# Patient Record
Sex: MALE | Race: WHITE
[De-identification: names, ages, dates, MRNs, and addresses within clinical notes are randomized per-mention and may not be internally consistent; named-entity substitution may affect disease eponyms.]

---

## 2017-07-12 ENCOUNTER — HOSPITAL ENCOUNTER (EMERGENCY)
Dept: HOSPITAL 25 - UCEAST | Age: 62
Discharge: HOME | End: 2017-07-12
Payer: COMMERCIAL

## 2017-07-12 VITALS — DIASTOLIC BLOOD PRESSURE: 92 MMHG | SYSTOLIC BLOOD PRESSURE: 118 MMHG
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
## 2017-07-12 DIAGNOSIS — Z88.5: ICD-10-CM
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
## 2017-07-12 DIAGNOSIS — R03.0: ICD-10-CM

## 2017-07-12 DIAGNOSIS — S61.210A: Primary | ICD-10-CM

## 2017-07-12 PROCEDURE — 99212 OFFICE O/P EST SF 10 MIN: CPT

## 2017-07-12 PROCEDURE — G0463 HOSPITAL OUTPT CLINIC VISIT: HCPCS

## 2017-07-12 NOTE — UC
Hand/Wrist HPI





- HPI Summary


HPI Summary: 





Cut R index finger on a router, area of skin missing and profuse bleeding, 

unable to control it. Last tetanus 2013.





- History Of Current Complaint


Chief Complaint: UCLaceration


Stated Complaint: FINGER LACERATION


Time Seen by Provider: 07/12/17 13:43


Hx Obtained From: Patient


Pregnant?: No


Onset/Duration: Sudden Onset


Severity Initially: Moderate


Severity Currently: Moderate


Character Of Pain: Sharp


Aggravating Factor(s): Other - touch


Alleviating: Compression


Related History: Dominant Hand Right





- Allergies/Home Medications


Allergies/Adverse Reactions: 


 Allergies











Allergy/AdvReac Type Severity Reaction Status Date / Time


 


Tramadol Allergy  Unknown Verified 08/27/16 13:04





   Reaction  





   Details  














PMH/Surg Hx/FS Hx/Imm Hx


Previously Healthy: Yes


Other History Of: 


   Negative For: Anticoagulant Therapy





- Surgical History


Surgical History: Yes


Surgery Procedure, Year, and Place: scrotal surgery





- Family History


Known Family History: Positive: None, Other - no respiratory disease


   Negative: Cardiac Disease





- Social History


Alcohol Use: Daily


Substance Use Type: None


Smoking Status (MU): Never Smoked Tobacco





- Immunization History


Most Recent Tetanus Shot: Oct 2013





Review of Systems


Constitutional: Negative


Skin: Other - R index finger injury


Eyes: Negative


ENT: Negative


Respiratory: Negative


Cardiovascular: Negative


Gastrointestinal: Negative


Genitourinary: Negative


Motor: Negative


Neurovascular: Negative


Musculoskeletal: Negative


Neurological: Negative


Psychological: Negative


All Other Systems Reviewed And Are Negative: Yes





Physical Exam


Triage Information Reviewed: Yes


Appearance: Well-Appearing, No Pain Distress, Well-Nourished


Vital Signs: 


 Initial Vital Signs











Temp  98 F   07/12/17 13:47


 


Pulse  54   07/12/17 13:47


 


Resp  16   07/12/17 13:47


 


BP  118/92   07/12/17 13:47


 


Pulse Ox  100   07/12/17 13:47











Vital Signs Reviewed: Yes


Eye Exam: Normal


Eyes: Positive: Conjunctiva Clear


ENT Exam: Normal


ENT: Positive: Normal ENT inspection, Hearing grossly normal, Pharynx normal, 

TMs normal


Dental Exam: Normal


Neck exam: Normal


Neck: Positive: Supple, Nontender, No Lymphadenopathy


Respiratory Exam: Normal


Respiratory: Positive: Chest non-tender, Lungs clear, Normal breath sounds, No 

respiratory distress, No accessory muscle use


Cardiovascular Exam: Normal


Cardiovascular: Positive: RRR, No Murmur


Musculoskeletal Exam: Normal


Neurological Exam: Normal


Neurological: Positive: Alert


Psychological Exam: Normal


Skin Exam: Other - skin avulsion on R index finger approx 1.5cm x 1cm, 

significant bleeding. Wound bandaged with gel foam.





Hand/Wrist Course/Dx





- Differential Dx/Diagnosis


Provider Diagnoses: R index finger skin avulsion.  elevated blood pressure due 

to stress





Discharge





- Discharge Plan


Condition: Stable


Disposition: HOME


Patient Education Materials:  Skin Avulsion (ED)


Additional Instructions: 


Try to keep the dressing dry and intact for 36-48 hours. After that, change 

your dressing (could be a regular adhesive bandage) whenever it is wet or 

dirty. You can wait until the gel foam comes off on its own, or you can soak it 

off in warm water after 5 days.

## 2018-01-07 ENCOUNTER — HOSPITAL ENCOUNTER (EMERGENCY)
Dept: HOSPITAL 25 - UCEAST | Age: 63
Discharge: HOME | End: 2018-01-07
Payer: COMMERCIAL

## 2018-01-07 VITALS — SYSTOLIC BLOOD PRESSURE: 113 MMHG | DIASTOLIC BLOOD PRESSURE: 77 MMHG

## 2018-01-07 DIAGNOSIS — Z72.89: ICD-10-CM

## 2018-01-07 DIAGNOSIS — S06.0X9A: Primary | ICD-10-CM
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
## 2018-01-07 DIAGNOSIS — S01.01XA: ICD-10-CM
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
## 2018-01-07 PROCEDURE — G0463 HOSPITAL OUTPT CLINIC VISIT: HCPCS

## 2018-01-07 PROCEDURE — 99212 OFFICE O/P EST SF 10 MIN: CPT

## 2018-01-07 NOTE — UC
Head Injury HPI





- HPI Summary


HPI Summary: 





Pt was skiing, was struck by a ski lift in the body and felt some bruising 

mainly in the L arm from that -- denies head or neck injury from ski lift. On 

the same run down the hill fell backwards and hit head on the ground, and place 

where goggles clasped behind his head he sustained bloody wound. Reports seeing 

stars after fall, then felt fuzzy/confused and still feels a little off, with 

only hazy memory of the ski lift and the fall on the slope. Denies current 

headache, any vomiting, seizures, visual changes, or trouble with balance.





- History Of Current Complaint


Chief Complaint: UCHeadInjury


Stated Complaint: FALL


Time Seen by Provider: 01/07/18 19:48


Hx Obtained From: Patient


Onset/Duration: Sudden Onset


Severity Currently: Mild


Severity Initially: Moderate


Character: Dull


Aggravating Factor(s): Nothing


Alleviating Factor(s): Nothing


Associated Signs And Symptoms: Positive: Confusion.  Negative: LOC (Time In 

Secs./Mins/Hrs), Memory Loss, Seizure, Neck Pain, Nausea, Vomiting





- Allergies/Home Medications


Allergies/Adverse Reactions: 


 Allergies











Allergy/AdvReac Type Severity Reaction Status Date / Time


 


Tramadol Allergy  Unknown Verified 01/07/18 18:36





   Reaction  





   Details  














PMH/Surg Hx/FS Hx/Imm Hx


Previously Healthy: Yes


Other History Of: 


   Negative For: Anticoagulant Therapy





- Surgical History


Surgical History: Yes


Surgery Procedure, Year, and Place: scrotal surgery





- Family History


Known Family History: Positive: None, Other - no respiratory disease


   Negative: Cardiac Disease





- Social History


Lives: With Family


Alcohol Use: Occasionally


Substance Use Type: None


Smoking Status (MU): Never Smoked Tobacco





- Immunization History


Most Recent Tetanus Shot: Oct 2013





Review of Systems


Constitutional: Negative


Skin: Bruising, Other - cut


Eyes: Negative


ENT: Negative


Respiratory: Negative


Cardiovascular: Negative


Gastrointestinal: Negative


Genitourinary: Negative


Motor: Negative


Neurovascular: Negative


Musculoskeletal: Negative


Neurological: Negative


Psychological: Negative


Is Patient Immunocompromised?: No


All Other Systems Reviewed And Are Negative: Yes





Physical Exam


Triage Information Reviewed: Yes


Appearance: Well-Appearing, No Pain Distress, Well-Nourished


Vital Signs: 


 Initial Vital Signs











Temp  98.3 F   01/07/18 18:32


 


Pulse  50   01/07/18 18:32


 


Resp  16   01/07/18 18:32


 


BP  128/76   01/07/18 18:32


 


Pulse Ox  100   01/07/18 18:32











Vital Signs Reviewed: Yes


Eye Exam: Normal, Other - PERRL


Eyes: Positive: Conjunctiva Clear


ENT Exam: Normal


ENT: Positive: Normal ENT inspection, Hearing grossly normal, Pharynx normal, 

TMs normal


Dental Exam: Normal


Dental: Negative: Percussion Tenderness @, Gross Decay/Caries @, Dental 

Fracture @


Neck exam: Normal, Other - no bony tenderness


Neck: Positive: Supple, Nontender, No Lymphadenopathy


Respiratory Exam: Normal


Respiratory: Positive: Chest non-tender, Lungs clear, Normal breath sounds, No 

respiratory distress, No accessory muscle use


Cardiovascular Exam: Normal


Cardiovascular: Positive: RRR, No Murmur


Musculoskeletal Exam: Normal


Musculoskeletal: Positive: Strength Intact, ROM Intact, No Edema


Neurological Exam: Other - serial 7s only with difficulty, 3 mistakes but able 

to start over and correct self. Unable to remember 3 words after 5 minute break


Neurological: Positive: Alert, Muscle Tone Normal


Psychological Exam: Normal


Psychological: Positive: Normal Response To Family


Skin Exam: Other - <1cm lac to posterior scalp, bleeding controlled





Head Injury Course/Dx





- Differential Dx/Diagnosis


Provider Diagnoses: scalp laceration, unsutured.  concussion.  fall.  struck by 

ski lift





Discharge





- Discharge Plan


Condition: Stable


Disposition: HOME


Patient Education Materials:  Concussion (ED)


Referrals: 


Shivam Dyer MD [Medical Doctor] - 


Additional Instructions: 


As we discussed, the small scalp wound should close up without problems (but don

't pick at the area). 





I expect you to have multiple aches and pains from your fall tomorrow.





Avoid driving or working heavy machinery until you are feeling 100% normal. 

Some headaches and dizziness are normal for a couple days, but you should go 

right to the emergency department if you have severe pain, prolonged vomiting, 

balance problems, speech difficulty, visual disturbance, or seizures.